# Patient Record
Sex: FEMALE | Race: BLACK OR AFRICAN AMERICAN | NOT HISPANIC OR LATINO
[De-identification: names, ages, dates, MRNs, and addresses within clinical notes are randomized per-mention and may not be internally consistent; named-entity substitution may affect disease eponyms.]

---

## 2023-06-28 ENCOUNTER — APPOINTMENT (OUTPATIENT)
Dept: ORTHOPEDIC SURGERY | Facility: CLINIC | Age: 53
End: 2023-06-28
Payer: MEDICARE

## 2023-06-28 DIAGNOSIS — E11.9 TYPE 2 DIABETES MELLITUS W/OUT COMPLICATIONS: ICD-10-CM

## 2023-06-28 DIAGNOSIS — I10 ESSENTIAL (PRIMARY) HYPERTENSION: ICD-10-CM

## 2023-06-28 DIAGNOSIS — M25.561 PAIN IN RIGHT KNEE: ICD-10-CM

## 2023-06-28 DIAGNOSIS — E78.00 PURE HYPERCHOLESTEROLEMIA, UNSPECIFIED: ICD-10-CM

## 2023-06-28 PROBLEM — Z00.00 ENCOUNTER FOR PREVENTIVE HEALTH EXAMINATION: Status: ACTIVE | Noted: 2023-06-28

## 2023-06-28 PROCEDURE — 73562 X-RAY EXAM OF KNEE 3: CPT | Mod: RT

## 2023-06-28 PROCEDURE — 20610 DRAIN/INJ JOINT/BURSA W/O US: CPT | Mod: RT

## 2023-06-28 PROCEDURE — 99213 OFFICE O/P EST LOW 20 MIN: CPT | Mod: 25

## 2023-06-28 RX ORDER — PANTOPRAZOLE 40 MG/1
40 TABLET, DELAYED RELEASE ORAL
Refills: 0 | Status: ACTIVE | COMMUNITY

## 2023-06-28 RX ORDER — PROGESTERONE 200 MG/1
CAPSULE ORAL
Refills: 0 | Status: ACTIVE | COMMUNITY

## 2023-06-28 RX ORDER — TESTOSTERONE 100 MG
100 PELLET (EA) IMPLANTATION
Refills: 0 | Status: ACTIVE | COMMUNITY

## 2023-06-28 RX ORDER — OLMESARTAN MEDOXOMIL 20 MG/1
20 TABLET, FILM COATED ORAL
Refills: 0 | Status: ACTIVE | COMMUNITY

## 2023-06-28 RX ORDER — METFORMIN HYDROCHLORIDE 500 MG/5ML
500 SOLUTION ORAL
Refills: 0 | Status: ACTIVE | COMMUNITY

## 2023-06-28 RX ORDER — FAMOTIDINE 10 MG/1
TABLET, FILM COATED ORAL
Refills: 0 | Status: ACTIVE | COMMUNITY

## 2023-06-28 RX ORDER — ESTRADIOL 0.75 MG/1.25G
0.75 MG/1.25 GM GEL, METERED TOPICAL
Refills: 0 | Status: ACTIVE | COMMUNITY

## 2023-06-28 NOTE — PROCEDURE
[Large Joint Injection] : Large joint injection [Right] : of the right [Knee] : knee [Pain] : pain [Inflammation] : inflammation [X-ray evidence of Osteoarthritis on this or prior visit] : x-ray evidence of Osteoarthritis on this or prior visit [Betadine] : betadine [Ethyl Chloride sprayed topically] : ethyl chloride sprayed topically [Sterile technique used] : sterile technique used [___ cc    6mg] :  Betamethasone (Celestone) ~Vcc of 6mg [___ cc    1%] : Lidocaine ~Vcc of 1%  [] : Patient tolerated procedure well [Call if redness, pain or fever occur] : call if redness, pain or fever occur [Apply ice for 15min out of every hour for the next 12-24 hours as tolerated] : apply ice for 15 minutes out of every hour for the next 12-24 hours as tolerated [Previous OTC use and PT nontherapeutic] : patient has tried OTC's including aspirin, Ibuprofen, Aleve, etc or prescription NSAIDS, and/or exercises at home and/or physical therapy without satisfactory response [Patient had decreased mobility in the joint] : patient had decreased mobility in the joint [Risks, benefits, alternatives discussed / Verbal consent obtained] : the risks benefits, and alternatives have been discussed, and verbal consent was obtained

## 2023-06-28 NOTE — HISTORY OF PRESENT ILLNESS
[de-identified] : patient is here today for her RT Knee. Pt admits NKI.  Patient notes pain anterolateral aspect of the knee for several mos.  She denies any injury but has been stydying and has been sitting for long periods of time. She notes clicking and some movement of the knee.

## 2023-06-28 NOTE — DISCUSSION/SUMMARY
[de-identified] : Patient understands a cortisone injection to the joint would delay joint replacement for 4-6 months \par \par Extensive discussion of the options was had with the patient. This discussion included both surgical and nonsurgical options. Options including but not limited to cortisone injection, viscosupplementation,  physical therapy, oral anti-inflammatories, MRI, arthroplasty and arthroscopy were discussed with the patient. We also discussed the option of observation, allowing the patient to continue conservative treatment and following up if the condition does not improve. Time was taken to go over any questions the patient had in regards to any of the treatment plans described. \par \par Patient was given a CSI in the RIGHT KNEE, advised to ice if sore, and will f/u in 1 month

## 2023-06-28 NOTE — PHYSICAL EXAM
[Right] : right knee [de-identified] : Constitutional: The patient appears well developed, well nourished. Examination of patients ability to communicate functionally was normal.  \par \par  \par \par Neurologic: Coordination is normal. Alert and oriented to time, place and person. No evidence of mood disorder, calm affect.  \par \par  \par \par RIGHT      KNEE: Inspection of the knee is as follows: mild effusion. no ecchymosis, no streaking, no erythema, no atrophy, no deformities of the quad tendon and no deformities of patellar tendon.  \par \par  \par \par Palpation of the knee is as follows:PF TTP on exam hard swelling noted anterolateral to the patellar which moves with flexion/exstension.   no obvious defects, no palpable masses, no increased warmth and no crepitus.  \par \par  \par \par Knee Range of Motion is as follows in degrees: \par \par   \par \par Extension: 0  \par \par Flexion: 125 \par \par  \par \par Strength examination of the knee is as follows:  \par \par  \par \par Quadriceps strength is 5/5 \par \par Hamstring strength is 5/5  \par \par  \par \par Ligament Stability and Special Test:  positive McMurrays test. ligamentously stable, negative anterior draw, negative Lachman test, negative posterior draw and no varus or valgus instability. patella tracks well and able to do active straight leg raise without an extensor lag.  \par \par  \par \par Neurological examination of the knee is as follows: light touch is intact throughout.  \par \par  \par \par Gait and function is as follows: non-antalgic gait.  [FreeTextEntry9] : ap/lat/skiers show significant spurring tricompartmental no frx noted possible loose body anterolateral aspect of the knee.

## 2023-07-13 ENCOUNTER — APPOINTMENT (OUTPATIENT)
Dept: ORTHOPEDIC SURGERY | Facility: CLINIC | Age: 53
End: 2023-07-13

## 2023-08-16 ENCOUNTER — APPOINTMENT (OUTPATIENT)
Dept: ORTHOPEDIC SURGERY | Facility: CLINIC | Age: 53
End: 2023-08-16
Payer: MEDICARE

## 2023-08-16 DIAGNOSIS — M17.11 UNILATERAL PRIMARY OSTEOARTHRITIS, RIGHT KNEE: ICD-10-CM

## 2023-08-16 PROCEDURE — 99213 OFFICE O/P EST LOW 20 MIN: CPT

## 2023-08-16 NOTE — PHYSICAL EXAM
[de-identified] : Constitutional: The patient appears well developed, well nourished. Examination of patients ability to communicate functionally was normal.       Neurologic: Coordination is normal. Alert and oriented to time, place and person. No evidence of mood disorder, calm affect.       RIGHT      KNEE: Inspection of the knee is as follows: mild effusion. no ecchymosis, no streaking, no erythema, no atrophy, no deformities of the quad tendon and no deformities of patellar tendon.       Palpation of the knee is as follows:PF TTP on exam hard swelling noted anterolateral to the patellar which moves with flexion/exstension.   no obvious defects, no palpable masses, no increased warmth and no crepitus.       Knee Range of Motion is as follows in degrees:       Extension: 0    Flexion: 125      Strength examination of the knee is as follows:       Quadriceps strength is 5/5   Hamstring strength is 5/5       Ligament Stability and Special Test:  positive McMurrays test. ligamentously stable, negative anterior draw, negative Lachman test, negative posterior draw and no varus or valgus instability. patella tracks well and able to do active straight leg raise without an extensor lag.       Neurological examination of the knee is as follows: light touch is intact throughout.       Gait and function is as follows: non-antalgic gait.

## 2023-08-16 NOTE — HISTORY OF PRESENT ILLNESS
[de-identified] : Patient is following up on the right knee, had CSI on 6/28/23. Patient reports there was temporary relief. Patient reports the pain is different now. Patient reports that she cannot kneel on the knee and that there is some pain when crossing the legs. Patient reprots using voltaren gel PRN.

## 2023-08-16 NOTE — DISCUSSION/SUMMARY
[de-identified] : Extensive discussion of the options was had with the patient. This discussion included both surgical and nonsurgical options. Options including but not limited to cortisone injection, viscosupplementation,  physical therapy, oral anti-inflammatories, MRI, arthroplasty and arthroscopy were discussed with the patient. We also discussed the option of observation, allowing the patient to continue rest, ice, NSAIDs and following up if the condition does not improve. Time was taken to go over any questions the patient had in regards to any of the treatment plans described.   Plan at this time is for gelsyn injection series. Will f/u with patient once auth acquired.  Entered by Bart Cisneros acting as scribe. Dr. Joaquin Attestation The documentation recorded by the scribe, in my presence, accurately reflects the service I, Dr. Joaquin, personally performed, and the decisions made by me with my edits as appropriate.

## 2023-08-18 VITALS — HEIGHT: 66.5 IN | BODY MASS INDEX: 26.2 KG/M2 | WEIGHT: 165 LBS

## 2023-09-06 ENCOUNTER — APPOINTMENT (OUTPATIENT)
Dept: ORTHOPEDIC SURGERY | Facility: CLINIC | Age: 53
End: 2023-09-06
Payer: MEDICARE

## 2023-09-06 PROCEDURE — 99213 OFFICE O/P EST LOW 20 MIN: CPT | Mod: 25

## 2023-09-06 PROCEDURE — 20611 DRAIN/INJ JOINT/BURSA W/US: CPT | Mod: RT

## 2023-09-06 NOTE — DISCUSSION/SUMMARY
[de-identified] : Large joint injection was performed of the RT knee.  The indication for this procedure was pain, inflammation and x-ray evidence of Osteoarthritis on this or prior visit, and this is a repeat injection series. The site was prepped with betadine and a sterile technique used. An injection of 16.8mg of Orthovisc, series # 1 was used  Patient tolerated procedure well. Patient was advised to call if redness, pain or fever occur and apply ice for 15 minutes out of every hour for the next 12-24 hours as tolerated.  Patient has tried OTC's including aspirin, Ibuprofen, Aleve, etc or prescription NSAIDS, and/or exercises at home and/or physical therapy without satisfactory response, patient had decreased mobility in the joint and the risks benefits, and alternatives have been discussed, and verbal consent was obtained.  Entered by Indira Light acting as scribe. Dr. Joaquin Attestation The documentation recorded by the scribe, in my presence, accurately reflects the service I, Dr. Joaquin, personally performed, and the decisions made by me with my edits as appropriate.

## 2023-09-13 ENCOUNTER — APPOINTMENT (OUTPATIENT)
Dept: ORTHOPEDIC SURGERY | Facility: CLINIC | Age: 53
End: 2023-09-13
Payer: MEDICARE

## 2023-09-13 DIAGNOSIS — Z78.9 OTHER SPECIFIED HEALTH STATUS: ICD-10-CM

## 2023-09-13 PROCEDURE — 20611 DRAIN/INJ JOINT/BURSA W/US: CPT | Mod: RT

## 2023-09-20 ENCOUNTER — APPOINTMENT (OUTPATIENT)
Dept: ORTHOPEDIC SURGERY | Facility: CLINIC | Age: 53
End: 2023-09-20
Payer: MEDICARE

## 2023-09-20 PROCEDURE — 99213 OFFICE O/P EST LOW 20 MIN: CPT | Mod: 25

## 2023-09-20 PROCEDURE — 20610 DRAIN/INJ JOINT/BURSA W/O US: CPT | Mod: RT

## 2023-11-01 ENCOUNTER — APPOINTMENT (OUTPATIENT)
Dept: ORTHOPEDIC SURGERY | Facility: CLINIC | Age: 53
End: 2023-11-01
Payer: MEDICARE

## 2023-11-01 VITALS — BODY MASS INDEX: 26.2 KG/M2 | HEIGHT: 66.5 IN | WEIGHT: 165 LBS

## 2023-11-01 PROCEDURE — 99213 OFFICE O/P EST LOW 20 MIN: CPT

## 2023-11-08 ENCOUNTER — APPOINTMENT (OUTPATIENT)
Dept: ORTHOPEDIC SURGERY | Facility: CLINIC | Age: 53
End: 2023-11-08
Payer: MEDICARE

## 2023-11-08 VITALS — HEIGHT: 66 IN | BODY MASS INDEX: 26.52 KG/M2 | WEIGHT: 165 LBS

## 2023-11-08 DIAGNOSIS — M70.62 TROCHANTERIC BURSITIS, RIGHT HIP: ICD-10-CM

## 2023-11-08 DIAGNOSIS — M70.61 TROCHANTERIC BURSITIS, RIGHT HIP: ICD-10-CM

## 2023-11-08 PROCEDURE — 73502 X-RAY EXAM HIP UNI 2-3 VIEWS: CPT

## 2023-11-08 PROCEDURE — 99213 OFFICE O/P EST LOW 20 MIN: CPT

## 2023-12-06 ENCOUNTER — APPOINTMENT (OUTPATIENT)
Dept: ORTHOPEDIC SURGERY | Facility: CLINIC | Age: 53
End: 2023-12-06

## 2024-03-13 ENCOUNTER — APPOINTMENT (OUTPATIENT)
Dept: ORTHOPEDIC SURGERY | Facility: CLINIC | Age: 54
End: 2024-03-13
Payer: MEDICARE

## 2024-03-13 DIAGNOSIS — S69.90XA UNSPECIFIED INJURY OF UNSPECIFIED WRIST, HAND AND FINGER(S), INITIAL ENCOUNTER: ICD-10-CM

## 2024-03-13 PROCEDURE — 73110 X-RAY EXAM OF WRIST: CPT | Mod: LT

## 2024-03-13 PROCEDURE — 99213 OFFICE O/P EST LOW 20 MIN: CPT

## 2024-03-13 NOTE — ASSESSMENT
[FreeTextEntry1] : EXAM Left wrist with mild swelling; no erythema; no ecchymosis. +ttp at dorsal/radial wrist. Able to make a composite fist, oppose thumb to small finger and abduct fingers. <2sec cap refill.  Left wrist radiographs with no fracture nor dislocation. Carpus alignment intact. (3-view)  ASSESSMENT & PLAN Left wrist sprain - reviewed radiographs and pathoanatomy with the patient. Discussed management to consist of NSAIDs prn, wrist brace prn, elevation, minimize use.   F/u 2-3 weeks to reassess

## 2024-03-13 NOTE — HISTORY OF PRESENT ILLNESS
[de-identified] : Age: 53 year F PMHx:DM2, HTN, Hyperlipidemia Hand Dominance: RHD Chief Complaint: left wrist Trauma: Patient reports feeling pain for 1 week after wrestling with her child. Patient reports feeling localized pain. Denies numbness/tingling. Patient reports having difficulty making a fist and lifting objects.  Outside Imaging/Treatment: none OTC Medications: none OT/PT: none Bracing: none Pain worse with: rotating the hand, lifting objects, sudden movements Pain better with: rest

## 2024-04-18 ENCOUNTER — APPOINTMENT (OUTPATIENT)
Dept: ORTHOPEDIC SURGERY | Facility: CLINIC | Age: 54
End: 2024-04-18
Payer: MEDICARE

## 2024-04-18 PROCEDURE — 72100 X-RAY EXAM L-S SPINE 2/3 VWS: CPT

## 2024-04-18 PROCEDURE — 99214 OFFICE O/P EST MOD 30 MIN: CPT

## 2024-04-18 RX ORDER — GABAPENTIN 300 MG/1
300 CAPSULE ORAL
Qty: 90 | Refills: 0 | Status: ACTIVE | COMMUNITY
Start: 2024-04-18 | End: 1900-01-01

## 2024-04-18 RX ORDER — METHOCARBAMOL 750 MG/1
750 TABLET, FILM COATED ORAL
Qty: 60 | Refills: 0 | Status: ACTIVE | COMMUNITY
Start: 2024-04-18 | End: 1900-01-01

## 2024-04-18 RX ORDER — METHYLPREDNISOLONE 4 MG/1
4 TABLET ORAL
Qty: 1 | Refills: 0 | Status: ACTIVE | COMMUNITY
Start: 2024-04-18 | End: 1900-01-01

## 2024-04-18 NOTE — PHYSICAL EXAM
[de-identified] : Constitutional: Well groomed and developed.  Respiratory: Normal, unlabored breathing. No use of accessory muscles.  Skin: No rashes or ulcers. Skin warm and dry.  Psychiatric: Oriented to time, place, person and event. No acute distress. Gait: Heel to toe Patient able to walk on toes and heels.    Lumbar spine:  Posture: Normal on coronal and sagittal alignment  AROM:  Flexion 30 Extension 0 Severe pain with simulated truncal motion   Tenderness:  Thoracic: No tenderness on palpation  Lumbar: Moderate tenderness on palpation    TTP on the left L5-S1 Sacrum/coccyx: no tenderness on palpation  Greater trochanteric bursa:  No tenderness  PSIS: None    Motor:                         R             L LE:                                IS                    5             5 Quad              5             5 TA                  5             5 EHL                5             5 Gastroc         5             5                 R  L DTR: Patella  2+  2+ Achilles  2+  2+  Sensory: Light touch sensation intact T12-S1  Babinski's Sign: Negative bilaterally  Straight leg raise test: Negative bilaterally  KAREEM test: negative bilaterally

## 2024-04-18 NOTE — HISTORY OF PRESENT ILLNESS
[Lower back] : lower back [Gradual] : gradual [8] : 8 [7] : 7 [Sharp] : sharp [Shooting] : shooting [Throbbing] : throbbing [Sleep] : sleep [Meds] : meds [Sitting] : sitting [Standing] : standing [Walking] : walking [Exercising] : exercising [de-identified] : Patient presents today with lower back ongoing for years with NKI. Patient previously had a lumbar spine discectomy, completed PT, and had injection therapy. Patient states her pain radiates into the left SI joint and wraps around her right side into her abdomen. Patient states her left leg has been numb since her surgery. Patient states she has muscle spasms in her left leg. Patient admits to using TENs unit. Patient admits o taking Aleve and Tylenol for pain with some relief. Patient admits to using Diclofenac gel. Patient denies recent imaging.  [] : no [FreeTextEntry9] : Diclofenac gel [de-identified] : Currently not working

## 2024-04-18 NOTE — IMAGING
[Facet arthropathy] : Facet arthropathy [Disc space narrowing] : Disc space narrowing [Spondylolithesis] : Spondylolithesis [FreeTextEntry1] : Spondy L4-5, DDD L5-S1

## 2024-04-18 NOTE — ASSESSMENT
[FreeTextEntry1] : 55 yo female pmhx microdiscectomy L5-S1 ~10yr ago presents today for eval of her low back. X-ray shows DDD and spondy. Patient with persistent low back pain and radiculopathy. I recommend PT and medication management at this time. If no relief, may consider RAÚL and MRI.   - Recommend physical therapy to regain range of motion, strengthening and symptomatic improvement. Prescription given in office today.   - Patient given a prescription for gabapentin 300mg TID today for their nerve pain. Advised patient on potential side effects, including drowsiness.   - Patient will begin Medrol.  Patient advised not to take any NSAIDs while taking the steroids.   - Patient given prescription for Robaxin 750mg today. Advised patient that medication may make them drowsy, start by taking it at night.   - Recommend NSAIDs PRN - Recommend heating pad use to decrease muscle spasm - Discussed the importance of home exercises, including but not limited to hamstring stretching and core strengthening   Patient was educated on their diagnosis today. All questions answered and patient expressed understanding.  Follow up in 2 months

## 2024-05-06 ENCOUNTER — APPOINTMENT (OUTPATIENT)
Dept: ORTHOPEDIC SURGERY | Facility: CLINIC | Age: 54
End: 2024-05-06

## 2024-06-13 ENCOUNTER — APPOINTMENT (OUTPATIENT)
Dept: ORTHOPEDIC SURGERY | Facility: CLINIC | Age: 54
End: 2024-06-13
Payer: MEDICARE

## 2024-06-13 DIAGNOSIS — M51.36 OTHER INTERVERTEBRAL DISC DEGENERATION, LUMBAR REGION: ICD-10-CM

## 2024-06-13 DIAGNOSIS — M43.16 SPONDYLOLISTHESIS, LUMBAR REGION: ICD-10-CM

## 2024-06-13 DIAGNOSIS — M54.16 RADICULOPATHY, LUMBAR REGION: ICD-10-CM

## 2024-06-13 DIAGNOSIS — Z98.890 OTHER SPECIFIED POSTPROCEDURAL STATES: ICD-10-CM

## 2024-06-13 DIAGNOSIS — M51.37 OTHER INTERVERTEBRAL DISC DEGENERATION, LUMBOSACRAL REGION: ICD-10-CM

## 2024-06-13 DIAGNOSIS — M48.061 SPINAL STENOSIS, LUMBAR REGION WITHOUT NEUROGENIC CLAUDICATION: ICD-10-CM

## 2024-06-13 DIAGNOSIS — M47.817 SPONDYLOSIS W/OUT MYELOPATHY OR RADICULOPATHY, LUMBOSACRAL REGION: ICD-10-CM

## 2024-06-13 PROCEDURE — 99214 OFFICE O/P EST MOD 30 MIN: CPT

## 2024-06-13 NOTE — HISTORY OF PRESENT ILLNESS
[de-identified] : Follow up lumbar spine. Patient states he has pain radiating into the left buttock, has numbness in her toes. Patient states she has chronic spasms. Patient state she feels increased pain with prolonged standing. Patient admits to using TENs unit. Patient admits to finishing Medrol pack with some relief. Patient admits to taking Gabapentin and Advil PRN for pain with some relief. Patient admits to using Diclofenac gel.   Today's Pain 4/10

## 2024-06-13 NOTE — PHYSICAL EXAM
[de-identified] : Constitutional: Well groomed and developed.  Respiratory: Normal, unlabored breathing. No use of accessory muscles.  Skin: No rashes or ulcers. Skin warm and dry.  Psychiatric: Oriented to time, place, person and event. No acute distress. Gait: Heel to toe Patient able to walk on toes and heels.    Lumbar spine:  Posture: Normal on coronal and sagittal alignment  AROM:  Flexion 40 Extension 10 Moderate pain with simulated truncal motion   Tenderness:  Thoracic: No tenderness on palpation  Lumbar: Moderate tenderness on palpation    TTP on the left L5-S1 Sacrum/coccyx: no tenderness on palpation  Greater trochanteric bursa:  No tenderness  PSIS: None    Motor:                         R             L LE:                                IS                    5             5 Quad              5             4 TA                  5             4 EHL                5             5 Gastroc         5             5                 R  L DTR: Patella  2+  2+ Achilles  2+  2+  Sensory: Light touch sensation decreased left L5 Babinski's Sign: Negative bilaterally  Straight leg raise test: Negative bilaterally  KAREEM test: negative bilaterally

## 2024-06-13 NOTE — ASSESSMENT
[FreeTextEntry1] : 55 yo female pmhx microdiscectomy L5-S1 ~10yr ago presents today for eval of her low back. Patient has undergone daily HEP as detailed below since their last visit on 4/18 with some improvement in their symptoms. They have also undergone medication management including but not limited to Medrol with temporary relief. Due to persistent pain and radicular symptoms despite conservative management, recommend an MRI to further evaluate for nerve involvement and degree of pathology.   - Patient given prescription for MRI, follow up after study is completed to discuss results.  Patient has been doing a home exercise plan based on exercises given on their last office visit.  These exercises include calf stretching, hamstring stretching, hip flexor stretching, hip rotator stretch, quad stretching, curl ups, bridges, prone press up, knee lift leg reach and wall slide.  Patient has been doing these exercises for over 6 weeks, roughly 4 times a week for 30 minutes daily.  Patient is still experiencing low back pain with radiculopathy.   - Recommend physical therapy to regain range of motion, strengthening and symptomatic improvement. Prescription given in office today.   - Recommend NSAIDs PRN - Recommend heating pad use to decrease muscle spasm - Discussed the importance of home exercises, including but not limited to hamstring stretching and core strengthening   Patient was educated on their diagnosis today. All questions answered and patient expressed understanding.  F/U after MRI